# Patient Record
(demographics unavailable — no encounter records)

---

## 2025-04-15 NOTE — CONSULT LETTER
[Dear  ___] : Dear  [unfilled], [Courtesy Letter:] : I had the pleasure of seeing your patient, [unfilled], in my office today. [Please see my note below.] : Please see my note below. [Consult Closing:] : Thank you very much for allowing me to participate in the care of this patient.  If you have any questions, please do not hesitate to contact me. [Sincerely,] : Sincerely, [FreeTextEntry3] : Shyann Bonilla MD

## 2025-04-15 NOTE — ASSESSMENT
[FreeTextEntry1] : Referred by Dr. Luiz Bassett  Patient is a 35-year-old [place]ym[place] speaking female here today for follow up visit after recent studies.  At the time of her 6 mos f.u. imaging for L targeted US, tech from [facility] called and stated pt had reported a ?new left breast lump with pain, so orders were changed to bilat dMMG (as a baseline study) given pt's age and palpable complaint. She has not felt the lump again.  Previously had reported breast pain which has since resolved. She also noted intermittent left white/yellow nipple discharge that only occurs with expression.  Pt denies any breast lesions, discharge or masses at present.  She states Gyn told her she wouldn't be able to use hormonal contraceptive injection since she had a left breast cyst. She's unsure if she wants more children at this time, but thinks she wants to go back on birth control.   7/12/24 ZP, R- no susp cystic or solid masses identified. No evidence of skin thickening or ductal ectasia. no evidence of axillary adenopathy. L- *6:00 2N complicated cyst, 0.4cm. There is no evidence of skin thickening or ductal ectasia. There is no evidence of axillary adenopathy. Impression: *small complicated cyst in area of concern L breast. Rec 6 mos f/u L targeted US. BR3  3/6/2025 [facility] bilat dMMG and L US: The breasts are HG dense, which may obscure small masses. No suspicious mass, microcalcs, or other sign of malignancy is identified. NGUYEN-0. L US: CLINICAL INDICATION: US was performed for the clinical indication of palpable lump UOQ. Patient cannot feel lump at the time of visit. FINDINGS: No suspicious solid mass. IMPRESSION: No mammographic or sonographic evidence of malignancy. RECOMMENDATION: Clinical follow up. BR1.   Fhx: No known family history of cancers. Not AJ.  CBE: 34B. No discrete masses or nodules palpated, MCKINLEY bilaterally. No axillary or SC adenopathy. Full ROM.   Reviewed CBE and recent studies with pt, benign findings with MCKINLEY. Can f.u with PCP/Gyn for age appropriate screening recommendations. F.u. prn if any further breast issues. Pt would like info for a new Gyn- contact information given to Kendy Ni. No contraindications to hormonal contraceptive from a breast standpoint.   PLAN: F.u. with PCP/Gyn for all age appropriate screenings recommendations. Contact info given to pt for Dr. Kendy Ni F.u with us prn if any further breast issues.

## 2025-04-15 NOTE — PHYSICAL EXAM
[Normocephalic] : normocephalic [Atraumatic] : atraumatic [Supple] : supple [No Supraclavicular Adenopathy] : no supraclavicular adenopathy [No Thyromegaly] : no thyromegaly [Examined in the supine and seated position] : examined in the supine and seated position [Symmetrical] : symmetrical [Bra Size: ___] : Bra Size: [unfilled] [No dominant masses] : no dominant masses in right breast  [No dominant masses] : no dominant masses left breast [No Nipple Retraction] : no left nipple retraction [No Nipple Discharge] : no left nipple discharge [No Axillary Lymphadenopathy] : no left axillary lymphadenopathy [No Edema] : no edema [No Swelling] : no swelling [Full ROM] : full range of motion [No Rashes] : no rashes [No Ulceration] : no ulceration [de-identified] : No discrete masses or nodules palpated, MCKINLEY bilaterally.  [TextEntry] : Psych: Appropriate, NAD, A&Ox3 Neuro: Intact grossly, gait normal

## 2025-04-15 NOTE — PAST MEDICAL HISTORY
[Menstruating] : The patient is menstruating [Menarche Age ____] : age at menarche was [unfilled] [History of Hormone Replacement Treatment] : has a history of hormone replacement treatment [Total Preg ___] : G[unfilled] [Live Births ___] : P[unfilled]  [Living ___] : Living: [unfilled] [FreeTextEntry7] : yes [FreeTextEntry8] : yes 4 months

## 2025-04-15 NOTE — ASSESSMENT
[FreeTextEntry1] : Referred by Dr. Luiz Bassett  Patient is a 35-year-old [place] speaking female here today for follow up visit after recent studies.  At the time of her 6 mos f.u. imaging for L targeted US, tech from [facility] called and stated pt had reported a ?new left breast lump with pain, so orders were changed to bilat dMMG (as a baseline study) given pt's age and palpable complaint. She has not felt the lump again.  Previously had reported breast pain which has since resolved. She also noted intermittent left white/yellow nipple discharge that only occurs with expression.  Pt denies any breast lesions, discharge or masses at present.  She states Gyn told her she wouldn't be able to use hormonal contraceptive injection since she had a left breast cyst. She's unsure if she wants more children at this time, but thinks she wants to go back on birth control.   7/12/24 ZP, R- no susp cystic or solid masses identified. No evidence of skin thickening or ductal ectasia. no evidence of axillary adenopathy. L- *6:00 2N complicated cyst, 0.4cm. There is no evidence of skin thickening or ductal ectasia. There is no evidence of axillary adenopathy. Impression: *small complicated cyst in area of concern L breast. Rec 6 mos f/u L targeted US. BR3  3/6/2025 [facility] bilat dMMG and L US: The breasts are HG dense, which may obscure small masses. No suspicious mass, microcalcs, or other sign of malignancy is identified. NGUYEN-0. L US: CLINICAL INDICATION: US was performed for the clinical indication of palpable lump UOQ. Patient cannot feel lump at the time of visit. FINDINGS: No suspicious solid mass. IMPRESSION: No mammographic or sonographic evidence of malignancy. RECOMMENDATION: Clinical follow up. BR1.   Fhx: No known family history of cancers. Not AJ.  CBE: 34B. No discrete masses or nodules palpated, MCKINLEY bilaterally. No axillary or SC adenopathy. Full ROM.   Reviewed CBE and recent studies with pt, benign findings with MCKINLEY. Can f.u with PCP/Gyn for age appropriate screening recommendations. F.u. prn if any further breast issues. Pt would like info for a new Gyn- contact information given to Kendy Ni. No contraindications to hormonal contraceptive from a breast standpoint.   PLAN: F.u. with PCP/Gyn for all age appropriate screenings recommendations. Contact info given to pt for Dr. Kendy Ni F.u with us prn if any further breast issues.

## 2025-04-15 NOTE — DATA REVIEWED
[FreeTextEntry1] : 3/6/2025 [facility] bilat dMMG and L US: The breasts are HG dense, which may obscure small masses. No suspicious mass, microcalcs, or other sign of malignancy is identified. NGUYEN-0. L US: CLINICAL INDICATION: US was performed for the clinical indication of palpable lump UOQ. Patient cannot feel lump at the time of visit. FINDINGS: No suspicious solid mass. IMPRESSION: No mammographic or sonographic evidence of malignancy. RECOMMENDATION: Clinical follow up. BR1.

## 2025-04-15 NOTE — HISTORY OF PRESENT ILLNESS
[FreeTextEntry1] : Referred by Dr. Luiz Bassett  Patient is a 35-year-old [place] citizen speaking female here today for follow up visit after recent studies.  At the time of her 6 mos f.u. imaging for L targeted US, tech from [facility] called and stated pt had reported a ?new left breast lump with pain, so orders were changed to bilat dMMG (as a baseline study) given pt's age and palpable complaint. She has not felt the lump again.  Previously had reported breast pain which has since resolved. She also noted intermittent left white/yellow nipple discharge that only occurs with expression.  Pt denies any breast lesions, discharge or masses at present.  She states Gyn told her she wouldn't be able to use hormonal contraceptive injection since she had a left breast cyst. She's unsure if she wants more children at this time, but thinks she wants to go back on birth control.   7/12/24 ZP, R- no susp cystic or solid masses identified. No evidence of skin thickening or ductal ectasia. no evidence of axillary adenopathy. L- *6:00 2N complicated cyst, 0.4cm. There is no evidence of skin thickening or ductal ectasia. There is no evidence of axillary adenopathy. Impression: *small complicated cyst in area of concern L breast. Rec 6 mos f/u L targeted US. BR3  3/6/2025 [facility] bilat dMMG and L US: The breasts are HG dense, which may obscure small masses. No suspicious mass, microcalcs, or other sign of malignancy is identified. NGUYEN-0. L US: CLINICAL INDICATION: US was performed for the clinical indication of palpable lump UOQ. Patient cannot feel lump at the time of visit. FINDINGS: No suspicious solid mass. IMPRESSION: No mammographic or sonographic evidence of malignancy. RECOMMENDATION: Clinical follow up. BR1.   Fhx: No known family history of cancers. Not AJ.

## 2025-04-15 NOTE — PHYSICAL EXAM
[Normocephalic] : normocephalic [Atraumatic] : atraumatic [Supple] : supple [No Supraclavicular Adenopathy] : no supraclavicular adenopathy [No Thyromegaly] : no thyromegaly [Examined in the supine and seated position] : examined in the supine and seated position [Symmetrical] : symmetrical [Bra Size: ___] : Bra Size: [unfilled] [No dominant masses] : no dominant masses in right breast  [No dominant masses] : no dominant masses left breast [No Nipple Retraction] : no left nipple retraction [No Nipple Discharge] : no left nipple discharge [No Axillary Lymphadenopathy] : no left axillary lymphadenopathy [No Edema] : no edema [No Swelling] : no swelling [Full ROM] : full range of motion [No Rashes] : no rashes [No Ulceration] : no ulceration [de-identified] : No discrete masses or nodules palpated, MCKINLEY bilaterally.  [TextEntry] : Psych: Appropriate, NAD, A&Ox3 Neuro: Intact grossly, gait normal